# Patient Record
Sex: MALE | Race: WHITE | Employment: FULL TIME | URBAN - METROPOLITAN AREA
[De-identification: names, ages, dates, MRNs, and addresses within clinical notes are randomized per-mention and may not be internally consistent; named-entity substitution may affect disease eponyms.]

---

## 2024-06-20 ENCOUNTER — APPOINTMENT (OUTPATIENT)
Dept: GENERAL RADIOLOGY | Facility: HOSPITAL | Age: 54
End: 2024-06-20
Attending: STUDENT IN AN ORGANIZED HEALTH CARE EDUCATION/TRAINING PROGRAM

## 2024-06-20 ENCOUNTER — HOSPITAL ENCOUNTER (EMERGENCY)
Facility: HOSPITAL | Age: 54
Discharge: HOME | End: 2024-06-20
Attending: STUDENT IN AN ORGANIZED HEALTH CARE EDUCATION/TRAINING PROGRAM

## 2024-06-20 VITALS
RESPIRATION RATE: 16 BRPM | BODY MASS INDEX: 21.87 KG/M2 | DIASTOLIC BLOOD PRESSURE: 70 MMHG | SYSTOLIC BLOOD PRESSURE: 129 MMHG | WEIGHT: 165 LBS | OXYGEN SATURATION: 98 % | HEART RATE: 61 BPM | HEIGHT: 73 IN | TEMPERATURE: 97 F

## 2024-06-20 DIAGNOSIS — M70.41 PREPATELLAR BURSITIS OF RIGHT KNEE: Primary | ICD-10-CM

## 2024-06-20 PROCEDURE — 73560 X-RAY EXAM OF KNEE 1 OR 2: CPT | Performed by: STUDENT IN AN ORGANIZED HEALTH CARE EDUCATION/TRAINING PROGRAM

## 2024-06-20 PROCEDURE — 99283 EMERGENCY DEPT VISIT LOW MDM: CPT

## 2024-06-20 PROCEDURE — 99284 EMERGENCY DEPT VISIT MOD MDM: CPT

## 2024-06-20 RX ORDER — KETOROLAC TROMETHAMINE 10 MG/1
10 TABLET, FILM COATED ORAL EVERY 6 HOURS PRN
Qty: 20 TABLET | Refills: 0 | Status: SHIPPED | OUTPATIENT
Start: 2024-06-20 | End: 2024-06-20

## 2024-06-20 RX ORDER — KETOROLAC TROMETHAMINE 10 MG/1
10 TABLET, FILM COATED ORAL EVERY 6 HOURS PRN
Qty: 20 TABLET | Refills: 0 | Status: SHIPPED | OUTPATIENT
Start: 2024-06-20 | End: 2024-06-25

## 2024-06-20 NOTE — DISCHARGE INSTRUCTIONS
When to get medical advice  Call your healthcare provider right away if any of these occur:  Redness or warmth over the painful area  Increasing pain or swelling at the joint  Fever of 100.4°F (38°C) or above lasting for 24 to 48 hours, or as advised  Chills

## 2024-06-20 NOTE — ED INITIAL ASSESSMENT (HPI)
Patient arrives to ER from home for evaluation of right knee swelling x about a month. Here today for new pain.     No warmth or redness noted.

## 2024-06-20 NOTE — ED PROVIDER NOTES
Patient Seen in: [facility] Emergency Department      History     Chief Complaint   Patient presents with    Swelling Edema     Stated Complaint: r knee swelling    Subjective:   HPI    Male with history of COPD presenting for evaluation of right knee pain.  He has had about 1 month history of swelling and pain in the right knee, this morning pain was more severe which prompted presentation to the Emergency Department.  No fevers chills or night sweats.  No redness or warmth of the knee.  Able to bear full weight.  Cannot recall any injury.  Works construction.    Objective:   Past Medical History:    COPD (chronic obstructive pulmonary disease) (HCC)              History reviewed. No pertinent surgical history.             Social History     Socioeconomic History    Marital status:    Tobacco Use    Smoking status: Every Day     Types: Cigarettes    Smokeless tobacco: Never   Substance and Sexual Activity    Alcohol use: Never     Comment: Sober    Drug use: Never     Comment: Sober 6 years     Social Determinants of Health     Financial Resource Strain: Low Risk  (10/28/2020)    Received from [facility]    Overall Financial Resource Strain (CARDIA)     Difficulty of Paying Living Expenses: Not hard at all   Food Insecurity: Low Risk  (9/22/2022)    Received from [facility]    Food Insecurity     Have there been times that your food ran out, and you didn't have money to get more?: No     Are there times that you worry that this might happen?: No   Transportation Needs: Low Risk  (9/22/2022)    Received from [facility]    Transportation Needs     Do you have trouble getting transportation to medical appointments?: No   Housing Stability: Low Risk  (9/22/2022)    Received from [facility]    Housing Stability     Are you concerned about having a safe and reliable place to live?: No              Review of  Systems    Positive for stated complaint: r knee swelling  Other systems are as noted in HPI.  Constitutional and vital signs reviewed.      All other systems reviewed and negative except as noted above.    Physical Exam     ED Triage Vitals [06/20/24 0731]   /80   Pulse 56   Resp 20   Temp 97 °F (36.1 °C)   Temp src Temporal   SpO2 98 %   O2 Device None (Room air)       Current Vitals:   Vital Signs  BP: 129/70  Pulse: 61  Resp: 16  Temp: 97 °F (36.1 °C)  Temp src: Temporal    Oxygen Therapy  SpO2: 98 %  O2 Device: None (Room air)            Physical Exam    Constitutional: awake, alert, no sig distress  HENT: mmm, no lesions,  Neck: normal range of motion, no tenderness, supple.  Eyes: PERRL, EOMI, conjunctiva normal, no discharge. Sclera anicteric.  Cardiovascular: rr no murmur  Respiratory: Normal breath sounds, no respiratory distress, no wheezing, no chest tenderness.  GI: Bowel sounds normal, Soft, no tenderness, no masses, no pulsatile masses.  : No CVA tenderness.  Skin: Warm, dry, no erythema, no rash.  Right lower extremity exam: Swelling in right prepatellar bursae, there is no redness tenderness or warmth, he has full passive and active range of motion of the knee hip and ankle.  2+ DP PT pulses.  Sensation intact light touch.  Neurologic: Alert & oriented x 3, normal motor function, normal sensory function, no focal deficits noted.  Psych: Calm, cooperative, nl affect        ED Course   Labs Reviewed - No data to display       ED Course as of 06/20/24 1409  ------------------------------------------------------------  Time: 06/20 0805  Comment: I reviewed x-ray which is significant for prepatellar swelling but no other acute bony findings.              MDM      53-year-old male with history as documented above presenting for evaluation of right knee pain and swelling.  Arrival vitals are stable and reassuring she is afebrile well-appearing.  Presentation seems most compatible with a prepatellar  bursitis.  I have a low clinical suspicion for septic bursitis as there is no fever or constitutional symptoms he has no redness or erythema or warmth or tenderness with palpation of the bursa.  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  Ddx: Prepatellar bursitis, knee effusion, considered septic bursitis, considered septic arthritis  Plan: XR, if no acute findings will plan for NSAIDs, rest, compression and orthopedic f/u  -We discussed the possibility of bursal aspiration, as of my initial evaluation I feel that the bursa is aseptic, and I think that there is risk in introducing secondary infection that outweighs clinical benefit of aspiration at this time patient is agreement with this plan will defer aspiration      Discussed return precautions and follow up instructions with patient who voiced understanding and agreement with the plan. All questions answered.                              Medical Decision Making      Disposition and Plan     Clinical Impression:  1. Prepatellar bursitis of right knee         Disposition:  Discharge  6/20/2024  8:14 am    Follow-up:  Behery, Omar Atef, MD  [address] [place] [address]  [facility] [address]  [phone]    Call today      [facility] Emergency Department  [address] [facility] [address]  [phone]  Follow up  As needed, If symptoms worsen          Medications Prescribed:  Discharge Medication List as of 6/20/2024  8:49 AM        START taking these medications    Details   Ketorolac Tromethamine 10 MG Oral Tab Take 1 tablet (10 mg total) by mouth every 6 (six) hours as needed for Pain., Normal, Disp-20 tablet, R-0

## (undated) NOTE — LETTER
Date & Time: 6/20/2024, 8:15 AM  Patient: Elliott Mackay  Encounter Provider(s):    Cory Light MD       To Whom It May Concern:    Elliott Mackay was seen and treated in our department on 6/20/2024. He can return to work with these limitations: limit movement as much as possible .    If you have any questions or concerns, please do not hesitate to call.        _____________________________  Physician/AP[facility] Signature